# Patient Record
Sex: MALE | Race: WHITE | Employment: FULL TIME | URBAN - METROPOLITAN AREA
[De-identification: names, ages, dates, MRNs, and addresses within clinical notes are randomized per-mention and may not be internally consistent; named-entity substitution may affect disease eponyms.]

---

## 2017-10-27 ENCOUNTER — HOSPITAL ENCOUNTER (OUTPATIENT)
Dept: PHYSICAL THERAPY | Age: 57
Discharge: HOME | End: 2017-10-27
Attending: NURSE PRACTITIONER
Payer: COMMERCIAL

## 2017-10-27 DIAGNOSIS — M54.5 LOW BACK PAIN, UNSPECIFIED BACK PAIN LATERALITY, UNSPECIFIED CHRONICITY, WITH SCIATICA PRESENCE UNSPECIFIED: ICD-10-CM

## 2017-10-27 PROCEDURE — 97014 ELECTRIC STIMULATION THERAPY: CPT

## 2017-10-27 PROCEDURE — 97140 MANUAL THERAPY 1/> REGIONS: CPT

## 2017-10-27 PROCEDURE — 97162 PT EVAL MOD COMPLEX 30 MIN: CPT

## 2017-10-27 NOTE — PROGRESS NOTES
Faizan Zapien  : 1960   Payor: Jenifer Leija / Plan: Za Quant / Product Type: Commerical /  [address]  at [address]  [address], [address], [place]  [phone]   [phone]        OUTPATIENT PHYSICAL THERAPY:Initial Assessment 10/27/2017   ICD-10: Treatment Diagnosis: Cervicalgia (M54.2); Low back pain (M54.5)  Precautions/Allergies:   Review of patient's allergies indicates no known allergies. Fall Risk Score: 1 (? 5 = High Risk)  MD Orders: please evaluate patient for low back pain, patient request dry needling MEDICAL/REFERRING DIAGNOSIS:  Low back pain, unspecified back pain laterality, unspecified chronicity, with sciatica presence unspecified [M54.5]   DATE OF ONSET: 6 weeks ago  REFERRING PHYSICIAN: Bunny Caputo NP  RETURN PHYSICIAN APPOINTMENT: --     INITIAL ASSESSMENT:  Mr. Pam Rae presents with low back pain and pain radiating down R arm. He has increased tone and tenderness in thoracic erector spinae musculature. Pt will benefit from skilled physical therapy to address dysfunctions and pain. PROBLEM LIST (Impacting functional limitations):  1. Decreased ADL/Functional Activities  2. Increased Pain  3. Decreased [place] with Home Exercise Program INTERVENTIONS PLANNED:  1. Home Exercise Program (HEP)  2. Manual Therapy including joint and soft tissue manipulation and mobilization, and dry needling  3. Therapeutic Exercise/Strengthening  4. Modalities including heat/cold application and electrical stimulation   TREATMENT PLAN:  Effective Dates: 10/27/17 TO 17. Frequency/Duration: 1-2 time a week for 5 weeks  GOALS: (Goals have been discussed and agreed upon with patient.)  Short-Term Functional Goals: Time Frame: 5 weeks  1. Pt will be independent with > or = 2 exercises in HEP. 2. Pt will report < or = 16 on Oswestry. Discharge Goals: Time Frame: 10 weeks  1. Pt will be independent with > or = 4 exercises in HEP. 2. Pt will report < or = 12 on Oswestry. Rehabilitation Potential For Stated Goals: Good  Regarding Rupinder Bonds therapy, I certify that the treatment plan above will be carried out by a therapist or under their direction. Thank you for this referral,  Sary Lamar, PT, DPT     Referring Physician Signature: Sarai Lopez NP              Date                    The information in this section was collected on 10/27/17 (except where otherwise noted). HISTORY:   History of Present Injury/Illness (Reason for Referral):  Pt reports right side low-mid back pain aggravated specifically by turning to the left for example when drying off after shower and wiping bottom after going to the bathroom. The pain is not bad until he has an 'episode' or it gets aggravated, then the pain is unbearable. He had come for physical therapy in December of 2016 here at [facility] clinic, treatment focused on stretching and dry needling and he reported pain relief which lasted until late summer/early fall of 2017. He requested from his doctor to return to physical therapy. He has also been experiencing pain down right shoulder into right arm. Past Medical History/Comorbidities:   Mr. Willian Ramírez  has a past medical history of Chronic pharyngitis; Dry mouth; Erectile dysfunction; Essential hypertension, benign; Hypercholesteremia; Laryngopharyngeal reflux; Other and unspecified hyperlipidemia; Reflux gastritis; and S/P colonoscopy. Mr. Willian Ramírez  has a past surgical history that includes shoulder arthroscopy (Right); knee arthroscopy (Right); and colonoscopy (2010).   Social History/Living Environment:     house  Prior Level of Function/Work/Activity:  Work in [employer] lab, involves sitting and standing positions  Dominant Side:         LEFT    Current Medications:       Current Outpatient Prescriptions:     simvastatin (ZOCOR) 40 mg tablet, TAKE 1 TABLET BY MOUTH NIGHTLY, Disp: 90 Tab, Rfl: 3    NEXIUM 40 mg capsule, Take 1 Cap by mouth daily. , Disp: 90 Cap, Rfl: 3    sildenafil citrate (VIAGRA) 100 mg tablet, Take 1 Tab by mouth as needed. , Disp: 10 Tab, Rfl: 5    lisinopril (PRINIVIL, ZESTRIL) 10 mg tablet, TAKE ONE TABLET BY MOUTH EVERY DAY, Disp: 90 Tab, Rfl: 3   Date Last Reviewed:  10/27/2017     Number of Personal Factors/Comorbidities that affect the Plan of Care: 1-2: MODERATE COMPLEXITY   EXAMINATION:   Observation/Orthostatic Postural Assessment:          Pt has no significant postural abnormalities in standing. Palpation:          Increased tenderness and tone in R mid-thoracic erector spinae (possibly longissimus)   ROM:          See below  Strength:          Generally decreased core strength  Balance:          No functional deficits noted  Special tests: negative Spurlings for cervical radiculopathy  SFMA Top Tier (FN = Functional and Non-painful, FP = Functional and Painful, DP = Dysfunctional and Painful, DN = Dysfunctional and Non-painful)  Cervical flexion: FN  Cervical extension: DN   Cervical rotation: L FN, R FN  Upper Extremity Pattern 1 (extension, IR): L DN, R DN  Upper Extremity Pattern 2 (flexion, ER): L DN, R DN  Multi-segmental flexion: DP  Multi-segmental extension: DP  Multi-segmental rotation: L DP, R DP  Single-Leg Stance: L FN, R FN  Overhead Deep Squat: DN   Summary: patient has the most pain and movement limitation with left multi-segmental rotation; demonstrates decreased mobility into flexion of lumbar spine and into extension of lumbar and thoracic region      Body Structures Involved:  1. Nerves  2. Bones  3. Joints  4. Muscles Body Functions Affected:  1. Sensory/Pain  2. Neuromusculoskeletal  3. Movement Related Activities and Participation Affected:  1. General Tasks and Demands  2. Self Care  3.  Community, Social and [place]   Number of elements (examined above) that affect the Plan of Care: 3: MODERATE COMPLEXITY   CLINICAL PRESENTATION:   Presentation: Evolving clinical presentation with changing clinical characteristics: MODERATE COMPLEXITY   CLINICAL DECISION MAKING:   Outcome Measure: Tool Used: Modified Oswestry Low Back Pain Questionnaire  Score:  Initial: 20/50  Most Recent: X/50 (Date: -- )   Interpretation of Score: Each section is scored on a 0-5 scale, 5 representing the greatest disability. The scores of each section are added together for a total score of 50. Score 0 1-10 11-20 21-30 31-40 41-49 50   Modifier CH CI CJ CK CL CM CN     Medical Necessity:   · Skilled intervention continues to be required due to decreased function. Reason for Services/Other Comments:  · Patient continues to require skilled intervention due to decreased function. Use of outcome tool(s) and clinical judgement create a POC that gives a: Questionable prediction of patient's progress: MODERATE COMPLEXITY            TREATMENT:   (In addition to Assessment/Re-Assessment sessions the following treatments were rendered)  Pre-treatment Symptoms/Complaints:  Pt reports high levels of pain when he gets pain in his right side back. The pain that radiates down R arm isn't severe, its just a constant dull ache. Pain: Initial:   -- Post Session:  --     THERAPEUTIC EXERCISE: (-- minutes):  Exercises per grid below to improve mobility and strength. Date:  10/27 Date:   Date:     Activity/Exercise Parameters Parameters Parameters         Thoracic flexion and extension X 10  And x 10 with rotational component     Open book X 10                                 MANUAL THERAPY: ( 15 minutes):  To increase motion and reduce pain  - instrument assisted soft tissue technique using dry needles to trigger points in R thoracic erector spinae musculature    MODALITIES: ( 10 minutes):   - premod electrical stimulation in conjunction with manual therapy to R thoracic erector spinae mm, intensity adjusted to pt tolerance    Treatment/Session Assessment:    · Response to Treatment:  Pt was agreeable to dry needling, no complications reported. · Compliance with Program/Exercises: Will assess as treatment progresses. · Recommendations/Intent for next treatment session: \"Next visit will focus on advancements to more challenging activities\".   Total Treatment Duration:     Future Appointments  Date Time Provider Felicia Kylah   11/10/2017 9:15 AM Frances Simmonds, PT, DPT [facility]   11/17/2017 9:15 AM Barbara Davila PT, KING [facility]   11/22/2017 9:15 AM Barbara Davila PT, DPT [facility]   9/18/2018 8:00 AM Parrish Jung MD [facility] D[facility]

## 2017-10-27 NOTE — PROGRESS NOTES
Ambulatory/Rehab Services H2 Model Falls Risk Assessment    Risk Factor Pts. ·   Confusion/Disorientation/Impulsivity  []    4 ·   Symptomatic Depression  []   2 ·   Altered Elimination  []   1 ·   Dizziness/Vertigo  []   1 ·   Gender (Male)  [x]   1 ·   Any administered antiepileptics (anticonvulsants):  []   2 ·   Any administered benzodiazepines:  []   1 ·   Visual Impairment (specify):  []   1 ·   Portable Oxygen Use  []   1 ·   Orthostatic ? BP  []   1 ·   History of Recent Falls (within 3 mos.)  []   5     Ability to Rise from Chair (choose one) Pts. ·   Ability to rise in a single movement  []   0 ·   Pushes up, successful in one attempt  []   1 ·   Multiple attempts, but successful  []   3 ·   Unable to rise without assistance  []   4   Total: (5 or greater = High Risk) 1     Falls Prevention Plan:   []                Physical Limitations to Exercise (specify):   []                Mobility Assistance Device (type):   []                Exercise/Equipment Adaptation (specify):    ©2010 [facility] [address] Patent #[id number].  Federal Law prohibits the replication, distribution or use without written permission from [facility] [employer]

## 2017-11-01 ENCOUNTER — APPOINTMENT (RX ONLY)
Dept: URBAN - METROPOLITAN AREA CLINIC 349 | Facility: CLINIC | Age: 57
Setting detail: DERMATOLOGY
End: 2017-11-01

## 2017-11-01 DIAGNOSIS — L57.8 OTHER SKIN CHANGES DUE TO CHRONIC EXPOSURE TO NONIONIZING RADIATION: ICD-10-CM

## 2017-11-01 DIAGNOSIS — D22 MELANOCYTIC NEVI: ICD-10-CM

## 2017-11-01 DIAGNOSIS — L81.2 FRECKLES: ICD-10-CM

## 2017-11-01 DIAGNOSIS — D18.0 HEMANGIOMA: ICD-10-CM

## 2017-11-01 PROBLEM — D22.5 MELANOCYTIC NEVI OF TRUNK: Status: ACTIVE | Noted: 2017-11-01

## 2017-11-01 PROBLEM — D18.01 HEMANGIOMA OF SKIN AND SUBCUTANEOUS TISSUE: Status: ACTIVE | Noted: 2017-11-01

## 2017-11-01 PROCEDURE — 99213 OFFICE O/P EST LOW 20 MIN: CPT

## 2017-11-01 PROCEDURE — ? COUNSELING

## 2017-11-01 ASSESSMENT — LOCATION SIMPLE DESCRIPTION DERM
LOCATION SIMPLE: LEFT SHOULDER
LOCATION SIMPLE: CHEST
LOCATION SIMPLE: LEFT CHEEK
LOCATION SIMPLE: RIGHT HAND
LOCATION SIMPLE: RIGHT CHEEK
LOCATION SIMPLE: RIGHT FOREARM
LOCATION SIMPLE: RIGHT SHOULDER
LOCATION SIMPLE: RIGHT UPPER BACK
LOCATION SIMPLE: UPPER BACK
LOCATION SIMPLE: INFERIOR FOREHEAD
LOCATION SIMPLE: LEFT HAND
LOCATION SIMPLE: LEFT UPPER BACK
LOCATION SIMPLE: LEFT FOREARM

## 2017-11-01 ASSESSMENT — LOCATION DETAILED DESCRIPTION DERM
LOCATION DETAILED: INFERIOR MID FOREHEAD
LOCATION DETAILED: LEFT MID-UPPER BACK
LOCATION DETAILED: RIGHT POSTERIOR SHOULDER
LOCATION DETAILED: RIGHT CENTRAL MALAR CHEEK
LOCATION DETAILED: LEFT CENTRAL MALAR CHEEK
LOCATION DETAILED: LEFT PROXIMAL DORSAL FOREARM
LOCATION DETAILED: RIGHT RADIAL DORSAL HAND
LOCATION DETAILED: STERNUM
LOCATION DETAILED: SUPERIOR THORACIC SPINE
LOCATION DETAILED: RIGHT MEDIAL UPPER BACK
LOCATION DETAILED: RIGHT ANTERIOR SHOULDER
LOCATION DETAILED: RIGHT PROXIMAL DORSAL FOREARM
LOCATION DETAILED: LEFT MEDIAL INFERIOR CHEST
LOCATION DETAILED: LEFT POSTERIOR SHOULDER
LOCATION DETAILED: LEFT ULNAR DORSAL HAND
LOCATION DETAILED: LEFT ANTERIOR SHOULDER

## 2017-11-01 ASSESSMENT — LOCATION ZONE DERM
LOCATION ZONE: FACE
LOCATION ZONE: HAND
LOCATION ZONE: TRUNK
LOCATION ZONE: ARM

## 2017-11-10 ENCOUNTER — HOSPITAL ENCOUNTER (OUTPATIENT)
Dept: PHYSICAL THERAPY | Age: 57
Discharge: HOME | End: 2017-11-10
Attending: NURSE PRACTITIONER
Payer: COMMERCIAL

## 2017-11-10 PROCEDURE — 97110 THERAPEUTIC EXERCISES: CPT

## 2017-11-10 PROCEDURE — 97140 MANUAL THERAPY 1/> REGIONS: CPT

## 2017-11-10 NOTE — PROGRESS NOTES
Gracie Luz  : 1960   Payor: Jeff Lucero / Plan: Aliza Euceda / Product Type: Commerical /  [address]  at [address]  [address], [address], [place] [address]  [phone]   [phone]        OUTPATIENT PHYSICAL THERAPY:Daily Note 11/10/2017   ICD-10: Treatment Diagnosis: Cervicalgia (M54.2); Low back pain (M54.5)  Precautions/Allergies:   Review of patient's allergies indicates no known allergies. Fall Risk Score: 1 (? 5 = High Risk)  MD Orders: please evaluate patient for low back pain, patient request dry needling MEDICAL/REFERRING DIAGNOSIS:  Chronic pain syndrome [G89.4]   DATE OF ONSET: 6 weeks ago  REFERRING PHYSICIAN: Lakshmi Greene NP  RETURN PHYSICIAN APPOINTMENT: --     INITIAL ASSESSMENT:  Mr. Luisa Huffman presents with low back pain and pain radiating down R arm. He has increased tone and tenderness in thoracic erector spinae musculature. Pt will benefit from skilled physical therapy to address dysfunctions and pain. PROBLEM LIST (Impacting functional limitations):  1. Decreased ADL/Functional Activities  2. Increased Pain  3. Decreased [place] with Home Exercise Program INTERVENTIONS PLANNED:  1. Home Exercise Program (HEP)  2. Manual Therapy including joint and soft tissue manipulation and mobilization, and dry needling  3. Therapeutic Exercise/Strengthening  4. Modalities including heat/cold application and electrical stimulation   TREATMENT PLAN:  Effective Dates: 10/27/17 TO 17. Frequency/Duration: 1-2 time a week for 5 weeks  GOALS: (Goals have been discussed and agreed upon with patient.)  Short-Term Functional Goals: Time Frame: 5 weeks  1. Pt will be independent with > or = 2 exercises in HEP. 2. Pt will report < or = 16 on Oswestry. Discharge Goals: Time Frame: 10 weeks  1. Pt will be independent with > or = 4 exercises in HEP. 2. Pt will report < or = 12 on Oswestry.    Rehabilitation Potential For Stated Goals: Good  Regarding Latoya Doan's therapy, I certify that the treatment plan above will be carried out by a therapist or under their direction. Thank you for this referral,  Muna Hernandez, PT, DPT     Referring Physician Signature: Danny Amos NP              Date                    The information in this section was collected on 10/27/17 (except where otherwise noted). HISTORY:   History of Present Injury/Illness (Reason for Referral):  Pt reports right side low-mid back pain aggravated specifically by turning to the left for example when drying off after shower and wiping bottom after going to the bathroom. The pain is not bad until he has an 'episode' or it gets aggravated, then the pain is unbearable. He had come for physical therapy in December of 2016 here at [facility] clinic, treatment focused on stretching and dry needling and he reported pain relief which lasted until late summer/early fall of 2017. He requested from his doctor to return to physical therapy. He has also been experiencing pain down right shoulder into right arm. Past Medical History/Comorbidities:   Mr. Peggy Sherwood  has a past medical history of Chronic pharyngitis; Dry mouth; Erectile dysfunction; Essential hypertension, benign; Hypercholesteremia; Laryngopharyngeal reflux; Other and unspecified hyperlipidemia; Reflux gastritis; and S/P colonoscopy. Mr. Peggy Sherwood  has a past surgical history that includes shoulder arthroscopy (Right); knee arthroscopy (Right); and colonoscopy (2010). Social History/Living Environment:     house  Prior Level of Function/Work/Activity:  Work in [employer] lab, involves sitting and standing positions  Dominant Side:         LEFT    Current Medications:       Current Outpatient Prescriptions:     simvastatin (ZOCOR) 40 mg tablet, TAKE 1 TABLET BY MOUTH NIGHTLY, Disp: 90 Tab, Rfl: 3    NEXIUM 40 mg capsule, Take 1 Cap by mouth daily. , Disp: 90 Cap, Rfl: 3    sildenafil citrate (VIAGRA) 100 mg tablet, Take 1 Tab by mouth as needed. , Disp: 10 Tab, Rfl: 5    lisinopril (PRINIVIL, ZESTRIL) 10 mg tablet, TAKE ONE TABLET BY MOUTH EVERY DAY, Disp: 90 Tab, Rfl: 3   Date Last Reviewed:  11/10/2017     Number of Personal Factors/Comorbidities that affect the Plan of Care: 1-2: MODERATE COMPLEXITY   EXAMINATION:   Observation/Orthostatic Postural Assessment:          Pt has no significant postural abnormalities in standing. Palpation:          Increased tenderness and tone in R mid-thoracic erector spinae (possibly longissimus)   ROM:          See below  Strength:          Generally decreased core strength  Balance:          No functional deficits noted  Special tests: negative Spurlings for cervical radiculopathy  SFMA Top Tier (FN = Functional and Non-painful, FP = Functional and Painful, DP = Dysfunctional and Painful, DN = Dysfunctional and Non-painful)  Cervical flexion: FN  Cervical extension: DN   Cervical rotation: L FN, R FN  Upper Extremity Pattern 1 (extension, IR): L DN, R DN  Upper Extremity Pattern 2 (flexion, ER): L DN, R DN  Multi-segmental flexion: DP  Multi-segmental extension: DP  Multi-segmental rotation: L DP, R DP  Single-Leg Stance: L FN, R FN  Overhead Deep Squat: DN   Summary: patient has the most pain and movement limitation with left multi-segmental rotation; demonstrates decreased mobility into flexion of lumbar spine and into extension of lumbar and thoracic region      Body Structures Involved:  1. Nerves  2. Bones  3. Joints  4. Muscles Body Functions Affected:  1. Sensory/Pain  2. Neuromusculoskeletal  3. Movement Related Activities and Participation Affected:  1. General Tasks and Demands  2. Self Care  3.  Community, Social and [place]   Number of elements (examined above) that affect the Plan of Care: 3: MODERATE COMPLEXITY   CLINICAL PRESENTATION:   Presentation: Evolving clinical presentation with changing clinical characteristics: [address] MAKING:   Outcome Measure: Tool Used: Modified Oswestry Low Back Pain Questionnaire  Score:  Initial: 20/50  Most Recent: X/50 (Date: -- )   Interpretation of Score: Each section is scored on a 0-5 scale, 5 representing the greatest disability. The scores of each section are added together for a total score of 50. Score 0 1-10 11-20 21-30 31-40 41-49 50   Modifier CH CI CJ CK CL CM CN     Medical Necessity:   · Skilled intervention continues to be required due to decreased function. Reason for Services/Other Comments:  · Patient continues to require skilled intervention due to decreased function. Use of outcome tool(s) and clinical judgement create a POC that gives a: Questionable prediction of patient's progress: MODERATE COMPLEXITY            TREATMENT:   (In addition to Assessment/Re-Assessment sessions the following treatments were rendered)  Pre-treatment Symptoms/Complaints:  Pt reports that he felt some better after the last session but still feels the tightness    Pain: Initial:   more soreness than pain at rest Post Session:  0/10, no soreness reported     THERAPEUTIC EXERCISE: (15 min):  Exercises per grid below to improve mobility and strength. Date:  10/27 Date:  11/10 Date:     Activity/Exercise Parameters Parameters Parameters   Education  Discussed working on self elongation and stretches as listed below    Thoracic flexion and extension X 10  And x 10 with rotational component X 10, time with breathing    Open book X 10     Sitting arm reach stretch  Lat elongation with L side-bend component , 30 sec, 3 sets, timed with breathing                           MANUAL THERAPY: (30 minutes):  To increase motion and reduce pain  - instrument assisted soft tissue technique using dry needles to trigger points in R thoracic erector spinae musculature  - soft tissue mobilization to lats and erector spina on the R side, done with active elongation and breathing in supine and sitting    MODALITIES: ( 10 minutes):   - premod electrical stimulation in conjunction with manual therapy to R thoracic erector spinae mm, intensity adjusted to pt tolerance    Treatment/Session Assessment:    · Response to Treatment:  Patient tolerated the session well. He reported feeling better after the session. Discussed his home program.   · Compliance with Program/Exercises: Will assess as treatment progresses. · Recommendations/Intent for next treatment session: \"Next visit will focus on advancements to more challenging activities\".   Total Treatment Duration: 45 min  PT Patient Time In/Time Out  Time In: 1030  Time Out: 1120  Future Appointments  Date Time Provider Felicia Montero   11/17/2017 9:15 AM Manuel Pardo, PT, DPT [facility]   11/22/2017 9:15 AM Manuel Pardo PT, DPT [facility]   9/18/2018 8:00 AM Shameka Bender MD [facility]       Falguni Jacobs, PT, DPT

## 2017-11-17 ENCOUNTER — HOSPITAL ENCOUNTER (OUTPATIENT)
Dept: PHYSICAL THERAPY | Age: 57
Discharge: HOME | End: 2017-11-17
Attending: NURSE PRACTITIONER
Payer: COMMERCIAL

## 2017-11-17 PROCEDURE — 97110 THERAPEUTIC EXERCISES: CPT

## 2017-11-17 PROCEDURE — 97140 MANUAL THERAPY 1/> REGIONS: CPT

## 2017-11-17 PROCEDURE — 97014 ELECTRIC STIMULATION THERAPY: CPT

## 2017-11-17 NOTE — PROGRESS NOTES
Guerita Ponderay  : 1960   Payor: Allan Miller / Plan: Italo Hoang / Product Type: Commerical /  [address]  at [facility]  [address], [address], [place]  [phone]   [phone]        OUTPATIENT PHYSICAL THERAPY:Daily Note 2017   ICD-10: Treatment Diagnosis: Cervicalgia (M54.2); Low back pain (M54.5)  Precautions/Allergies:   Review of patient's allergies indicates no known allergies. Fall Risk Score: 1 (? 5 = High Risk)  MD Orders: please evaluate patient for low back pain, patient request dry needling MEDICAL/REFERRING DIAGNOSIS:  Chronic pain syndrome [G89.4]   DATE OF ONSET: 6 weeks ago  REFERRING PHYSICIAN: Eliana Marquez NP  RETURN PHYSICIAN APPOINTMENT: --     INITIAL ASSESSMENT:  Mr. Carmel Baig presents with low back pain and pain radiating down R arm. He has increased tone and tenderness in thoracic erector spinae musculature. Pt will benefit from skilled physical therapy to address dysfunctions and pain. PROBLEM LIST (Impacting functional limitations):  1. Decreased ADL/Functional Activities  2. Increased Pain  3. Decreased [place] with Home Exercise Program INTERVENTIONS PLANNED:  1. Home Exercise Program (HEP)  2. Manual Therapy including joint and soft tissue manipulation and mobilization, and dry needling  3. Therapeutic Exercise/Strengthening  4. Modalities including heat/cold application and electrical stimulation   TREATMENT PLAN:  Effective Dates: 10/27/17 TO 17. Frequency/Duration: 1-2 time a week for 5 weeks  GOALS: (Goals have been discussed and agreed upon with patient.)  Short-Term Functional Goals: Time Frame: 5 weeks  1. Pt will be independent with > or = 2 exercises in HEP. 2. Pt will report < or = 16 on Oswestry. Discharge Goals: Time Frame: 10 weeks  1. Pt will be independent with > or = 4 exercises in HEP. 2. Pt will report < or = 12 on Oswestry.    Rehabilitation Potential For Stated Goals: Good  Regarding Milan Scheuermann Videtto's therapy, I certify that the treatment plan above will be carried out by a therapist or under their direction. Thank you for this referral,  Jeimy English, PT, DPT     Referring Physician Signature: Venancio Peter NP              Date                    The information in this section was collected on 10/27/17 (except where otherwise noted). HISTORY:   History of Present Injury/Illness (Reason for Referral):  Pt reports right side low-mid back pain aggravated specifically by turning to the left for example when drying off after shower and wiping bottom after going to the bathroom. The pain is not bad until he has an 'episode' or it gets aggravated, then the pain is unbearable. He had come for physical therapy in December of 2016 here at [facility] clinic, treatment focused on stretching and dry needling and he reported pain relief which lasted until late summer/early fall of 2017. He requested from his doctor to return to physical therapy. He has also been experiencing pain down right shoulder into right arm. Past Medical History/Comorbidities:   Mr. Linda Brooks  has a past medical history of Chronic pharyngitis; Dry mouth; Erectile dysfunction; Essential hypertension, benign; Hypercholesteremia; Laryngopharyngeal reflux; Other and unspecified hyperlipidemia; Reflux gastritis; and S/P colonoscopy. Mr. Linda Brooks  has a past surgical history that includes shoulder arthroscopy (Right); knee arthroscopy (Right); and colonoscopy (2010). Social History/Living Environment:     house  Prior Level of Function/Work/Activity:  Work in [employer] lab, involves sitting and standing positions  Dominant Side:         LEFT    Current Medications:       Current Outpatient Prescriptions:     simvastatin (ZOCOR) 40 mg tablet, TAKE 1 TABLET BY MOUTH NIGHTLY, Disp: 90 Tab, Rfl: 3    NEXIUM 40 mg capsule, Take 1 Cap by mouth daily. , Disp: 90 Cap, Rfl: 3    sildenafil citrate (VIAGRA) 100 mg tablet, Take 1 Tab by mouth as needed. , Disp: 10 Tab, Rfl: 5    lisinopril (PRINIVIL, ZESTRIL) 10 mg tablet, TAKE ONE TABLET BY MOUTH EVERY DAY, Disp: 90 Tab, Rfl: 3   Date Last Reviewed:  11/17/2017     Number of Personal Factors/Comorbidities that affect the Plan of Care: 1-2: MODERATE COMPLEXITY   EXAMINATION:   Observation/Orthostatic Postural Assessment:          Pt has no significant postural abnormalities in standing. Palpation:          Increased tenderness and tone in R mid-thoracic erector spinae (possibly longissimus)   ROM:          See below  Strength:          Generally decreased core strength  Balance:          No functional deficits noted  Special tests: negative Spurlings for cervical radiculopathy  SFMA Top Tier (FN = Functional and Non-painful, FP = Functional and Painful, DP = Dysfunctional and Painful, DN = Dysfunctional and Non-painful)  Cervical flexion: FN  Cervical extension: DN   Cervical rotation: L FN, R FN  Upper Extremity Pattern 1 (extension, IR): L DN, R DN  Upper Extremity Pattern 2 (flexion, ER): L DN, R DN  Multi-segmental flexion: DP  Multi-segmental extension: DP  Multi-segmental rotation: L DP, R DP  Single-Leg Stance: L FN, R FN  Overhead Deep Squat: DN   Summary: patient has the most pain and movement limitation with left multi-segmental rotation; demonstrates decreased mobility into flexion of lumbar spine and into extension of lumbar and thoracic region      Body Structures Involved:  1. Nerves  2. Bones  3. Joints  4. Muscles Body Functions Affected:  1. Sensory/Pain  2. Neuromusculoskeletal  3. Movement Related Activities and Participation Affected:  1. General Tasks and Demands  2. Self Care  3.  Community, Social and [place]   Number of elements (examined above) that affect the Plan of Care: 3: MODERATE COMPLEXITY   CLINICAL PRESENTATION:   Presentation: Evolving clinical presentation with changing clinical characteristics: [address] MAKING:   Outcome Measure: Tool Used: Modified Oswestry Low Back Pain Questionnaire  Score:  Initial: 20/50  Most Recent: X/50 (Date: -- )   Interpretation of Score: Each section is scored on a 0-5 scale, 5 representing the greatest disability. The scores of each section are added together for a total score of 50. Score 0 1-10 11-20 21-30 31-40 41-49 50   Modifier CH CI CJ CK CL CM CN     Medical Necessity:   · Skilled intervention continues to be required due to decreased function. Reason for Services/Other Comments:  · Patient continues to require skilled intervention due to decreased function. Use of outcome tool(s) and clinical judgement create a POC that gives a: Questionable prediction of patient's progress: MODERATE COMPLEXITY            TREATMENT:   (In addition to Assessment/Re-Assessment sessions the following treatments were rendered)  Pre-treatment Symptoms/Complaints:  Pt reports that he has not experienced the mm spasm since restarting therapy. Back still a little tight  Pain: Initial:   no pain, some tightness Post Session:  Reports back feels better     THERAPEUTIC EXERCISE: (15 min):  Exercises per grid below to improve mobility and strength. Date:  10/27 Date:  11/10 Date:  11/17   Activity/Exercise Parameters Parameters Parameters   Education  Discussed working on self elongation and stretches as listed below    Recumbent stepper   7 min level 1   Thoracic flexion and extension X 10  And x 10 with rotational component X 10, time with breathing X 10   Open book X 10     Sitting arm reach stretch  Lat elongation with L side-bend component , 30 sec, 3 sets, timed with breathing  Lat elongation with L side-bend component , 30 sec, 3 sets, timed with breathing                          MANUAL THERAPY: (15 minutes):  To increase motion and reduce pain  - instrument assisted soft tissue technique using dry needles to trigger points in R thoracic erector spinae musculature and R thoracic multifidi  - PAs and rotational mobilizations to TL junction region    MODALITIES: ( 12 minutes):   -IFC electrical stimulation in conjunction with manual therapy to R thoracic erector spinae mm, intensity adjusted to pt tolerance    Treatment/Session Assessment:    · Response to Treatment:  Patient tolerated the session well. He reported feeling better after the session. Discussed his home program.   · Compliance with Program/Exercises: Will assess as treatment progresses. · Recommendations/Intent for next treatment session: \"Next visit will focus on advancements to more challenging activities\".   Total Treatment Duration: 45 min  PT Patient Time In/Time Out  Time In: 0915  Time Out: 1000  Future Appointments  Date Time Provider Felicia Motnero   11/22/2017 9:15 AM Linda Wu PT, DPT [facility]   9/18/2018 8:00 AM David Alvares MD [facility]       Linda Wu PT, DPT

## 2017-11-22 ENCOUNTER — HOSPITAL ENCOUNTER (OUTPATIENT)
Dept: PHYSICAL THERAPY | Age: 57
Discharge: HOME | End: 2017-11-22
Attending: NURSE PRACTITIONER
Payer: COMMERCIAL

## 2017-11-22 PROCEDURE — 97110 THERAPEUTIC EXERCISES: CPT

## 2017-11-22 PROCEDURE — 97140 MANUAL THERAPY 1/> REGIONS: CPT

## 2017-11-22 PROCEDURE — 97014 ELECTRIC STIMULATION THERAPY: CPT

## 2017-11-22 NOTE — PROGRESS NOTES
Derek Genre  : 1960   Payor: Desirae Stern / Plan: Chidi Rust / Product Type: Commerical /  [address]  at [facility]  [address], [place] [address]  [phone]   [phone]        OUTPATIENT PHYSICAL THERAPY:Daily Note 2017   ICD-10: Treatment Diagnosis: Cervicalgia (M54.2); Low back pain (M54.5)  Precautions/Allergies:   Review of patient's allergies indicates no known allergies. Fall Risk Score: 1 (? 5 = High Risk)  MD Orders: please evaluate patient for low back pain, patient request dry needling MEDICAL/REFERRING DIAGNOSIS:  Chronic pain syndrome [G89.4]   DATE OF ONSET: 6 weeks ago  REFERRING PHYSICIAN: Venancio Peter NP  RETURN PHYSICIAN APPOINTMENT: --     INITIAL ASSESSMENT:  Mr. Linda Brooks presents with low back pain and pain radiating down R arm. He has increased tone and tenderness in thoracic erector spinae musculature. Pt will benefit from skilled physical therapy to address dysfunctions and pain. PROBLEM LIST (Impacting functional limitations):  1. Decreased ADL/Functional Activities  2. Increased Pain  3. Decreased [place] with Home Exercise Program INTERVENTIONS PLANNED:  1. Home Exercise Program (HEP)  2. Manual Therapy including joint and soft tissue manipulation and mobilization, and dry needling  3. Therapeutic Exercise/Strengthening  4. Modalities including heat/cold application and electrical stimulation   TREATMENT PLAN:  Effective Dates: 10/27/17 TO 17. Frequency/Duration: 1-2 time a week for 5 weeks  GOALS: (Goals have been discussed and agreed upon with patient.)  Short-Term Functional Goals: Time Frame: 5 weeks  1. Pt will be independent with > or = 2 exercises in HEP. 2. Pt will report < or = 16 on Oswestry. Discharge Goals: Time Frame: 10 weeks  1. Pt will be independent with > or = 4 exercises in HEP. 2. Pt will report < or = 12 on Oswestry.    Rehabilitation Potential For Stated Goals: Good  Regarding Lizzie Doan's therapy, I certify that the treatment plan above will be carried out by a therapist or under their direction. Thank you for this referral,  Linda Wu, PT, DPT     Referring Physician Signature: Thomas Sanches NP              Date                    The information in this section was collected on 10/27/17 (except where otherwise noted). HISTORY:   History of Present Injury/Illness (Reason for Referral):  Pt reports right side low-mid back pain aggravated specifically by turning to the left for example when drying off after shower and wiping bottom after going to the bathroom. The pain is not bad until he has an 'episode' or it gets aggravated, then the pain is unbearable. He had come for physical therapy in December of 2016 here at [facility] clinic, treatment focused on stretching and dry needling and he reported pain relief which lasted until late summer/early fall of 2017. He requested from his doctor to return to physical therapy. He has also been experiencing pain down right shoulder into right arm. Past Medical History/Comorbidities:   Mr. Deana Bradford  has a past medical history of Chronic pharyngitis; Dry mouth; Erectile dysfunction; Essential hypertension, benign; Hypercholesteremia; Laryngopharyngeal reflux; Other and unspecified hyperlipidemia; Reflux gastritis; and S/P colonoscopy. Mr. Deana Bradford  has a past surgical history that includes shoulder arthroscopy (Right); knee arthroscopy (Right); and colonoscopy (2010). Social History/Living Environment:     house  Prior Level of Function/Work/Activity:  Work in [employer] lab, involves sitting and standing positions  Dominant Side:         LEFT    Current Medications:       Current Outpatient Prescriptions:     simvastatin (ZOCOR) 40 mg tablet, TAKE 1 TABLET BY MOUTH NIGHTLY, Disp: 90 Tab, Rfl: 3    NEXIUM 40 mg capsule, Take 1 Cap by mouth daily. , Disp: 90 Cap, Rfl: 3    sildenafil citrate (VIAGRA) 100 mg tablet, Take 1 Tab by mouth as needed. , Disp: 10 Tab, Rfl: 5    lisinopril (PRINIVIL, ZESTRIL) 10 mg tablet, TAKE ONE TABLET BY MOUTH EVERY DAY, Disp: 90 Tab, Rfl: 3   Date Last Reviewed:  11/22/2017     Number of Personal Factors/Comorbidities that affect the Plan of Care: 1-2: MODERATE COMPLEXITY   EXAMINATION:   Observation/Orthostatic Postural Assessment:          Pt has no significant postural abnormalities in standing. Palpation:          Increased tenderness and tone in R mid-thoracic erector spinae (possibly longissimus)   ROM:          See below  Strength:          Generally decreased core strength  Balance:          No functional deficits noted  Special tests: negative Spurlings for cervical radiculopathy  SFMA Top Tier (FN = Functional and Non-painful, FP = Functional and Painful, DP = Dysfunctional and Painful, DN = Dysfunctional and Non-painful)  Cervical flexion: FN  Cervical extension: DN   Cervical rotation: L FN, R FN  Upper Extremity Pattern 1 (extension, IR): L DN, R DN  Upper Extremity Pattern 2 (flexion, ER): L DN, R DN  Multi-segmental flexion: DP  Multi-segmental extension: DP  Multi-segmental rotation: L DP, R DP  Single-Leg Stance: L FN, R FN  Overhead Deep Squat: DN   Summary: patient has the most pain and movement limitation with left multi-segmental rotation; demonstrates decreased mobility into flexion of lumbar spine and into extension of lumbar and thoracic region      Body Structures Involved:  1. Nerves  2. Bones  3. Joints  4. Muscles Body Functions Affected:  1. Sensory/Pain  2. Neuromusculoskeletal  3. Movement Related Activities and Participation Affected:  1. General Tasks and Demands  2. Self Care  3.  Community, Social and [place]   Number of elements (examined above) that affect the Plan of Care: 3: MODERATE COMPLEXITY   CLINICAL PRESENTATION:   Presentation: Evolving clinical presentation with changing clinical characteristics: [address] MAKING:   Outcome Measure: Tool Used: Modified Oswestry Low Back Pain Questionnaire  Score:  Initial: 20/50  Most Recent: X/50 (Date: -- )   Interpretation of Score: Each section is scored on a 0-5 scale, 5 representing the greatest disability. The scores of each section are added together for a total score of 50. Score 0 1-10 11-20 21-30 31-40 41-49 50   Modifier CH CI CJ CK CL CM CN     Medical Necessity:   · Skilled intervention continues to be required due to decreased function. Reason for Services/Other Comments:  · Patient continues to require skilled intervention due to decreased function. Use of outcome tool(s) and clinical judgement create a POC that gives a: Questionable prediction of patient's progress: MODERATE COMPLEXITY            TREATMENT:   (In addition to Assessment/Re-Assessment sessions the following treatments were rendered)  Pre-treatment Symptoms/Complaints:  Pt reports that the right side still feels tight but that he hasn't experienced the back pain or spasms since starting therapy. Pain: Initial:   no pain, some tightness Post Session:  Reports back feels better     THERAPEUTIC EXERCISE: (30 min):  Exercises per grid below to improve mobility and strength.     Date:  10/27 Date:  11/10 Date:  11/17 11/22   Activity/Exercise Parameters Parameters Parameters    Education  Discussed working on self elongation and stretches as listed below     Recumbent stepper   7 min level 1 8 min level 3   Thoracic flexion and extension X 10  And x 10 with rotational component X 10, time with breathing X 10 X 10   Open book X 10      Sitting arm reach stretch  Lat elongation with L side-bend component , 30 sec, 3 sets, timed with breathing  Lat elongation with L side-bend component , 30 sec, 3 sets, timed with breathing  Lat elongation with L side-bend component , 30 sec, 3 sets, timed with breathing    Cat- camel    X 10   Quadruped, arm behind back, thoracic rotation    X 5 with therapist overpressure   Prone press up    X 10       MANUAL THERAPY: (15 minutes): To increase motion and reduce pain  - instrument assisted soft tissue technique using dry needles to trigger points in R thoracic erector spinae musculature and R thoracic multifidi  - PAs and rotational mobilizations to TL junction region    MODALITIES: ( 15 minutes):   -IFC electrical stimulation in conjunction with manual therapy to R thoracic erector spinae mm, intensity adjusted to pt tolerance    Treatment/Session Assessment:    · Response to Treatment:  Patient tolerated the session well. He reported feeling better after the session. Pt scheduled one more treatment for a couple weeks out to assess long term progress. · Compliance with Program/Exercises: Will assess as treatment progresses. · Recommendations/Intent for next treatment session: \"Next visit will focus on advancements to more challenging activities\".   Total Treatment Duration:   PT Patient Time In/Time Out  Time In: 0915  Time Out: 1010  Future Appointments  Date Time Provider Felicia Montero   12/15/2017 9:15 AM Irais Burt PT, DPT [facility]   9/18/2018 8:00 AM Azalia Abernathy MD [facility]       Irais Burt PT DPT

## 2017-12-15 ENCOUNTER — HOSPITAL ENCOUNTER (OUTPATIENT)
Dept: PHYSICAL THERAPY | Age: 57
Discharge: HOME | End: 2017-12-15
Attending: NURSE PRACTITIONER
Payer: COMMERCIAL

## 2017-12-15 PROCEDURE — 97140 MANUAL THERAPY 1/> REGIONS: CPT

## 2017-12-15 PROCEDURE — 97110 THERAPEUTIC EXERCISES: CPT

## 2017-12-15 NOTE — PROGRESS NOTES
Bakari Pizano  : 1960   Payor: Renae Israel / Plan: Lillian Berry / Product Type: Commerical /  [address]  at [address]  [address], [address], [place] [address]  [phone]   [phone]        OUTPATIENT PHYSICAL THERAPY:Daily Note and Recertification    ICD-10: Treatment Diagnosis: Cervicalgia (M54.2); Low back pain (M54.5)  Precautions/Allergies:   Review of patient's allergies indicates no known allergies. Fall Risk Score: 1 (? 5 = High Risk)  MD Orders: please evaluate patient for low back pain, patient request dry needling MEDICAL/REFERRING DIAGNOSIS:  Chronic pain syndrome [G89.4]   DATE OF ONSET: --  REFERRING PHYSICIAN: Thomas Sanches NP  RETURN PHYSICIAN APPOINTMENT: --     INITIAL ASSESSMENT:  Mr. Deana Bradford has attended 5 physical therapy treatments for back pain. He has responded well to dry needling technique with appropriate HEP, he reports that the low back pain has not been bothersome over the past few weeks. Today he returned to physical therapy with a report of right scapulothoracic pain from overstressing a muscle in the gym. We addressed this with dry needling and exercise. Patient will benefit from continuing physical therapy until these symptoms calm down. PROBLEM LIST (Impacting functional limitations):  1. Decreased ADL/Functional Activities  2. Increased Pain  3. Decreased [place] with Home Exercise Program INTERVENTIONS PLANNED:  1. Home Exercise Program (HEP)  2. Manual Therapy including joint and soft tissue manipulation and mobilization, and dry needling  3. Therapeutic Exercise/Strengthening  4. Modalities including heat/cold application and electrical stimulation   TREATMENT PLAN:  Effective Dates: 12/15/17 to 2/15/18. Frequency/Duration: as needed  GOALS: (Goals have been discussed and agreed upon with patient.)  Short-Term Functional Goals: Time Frame: 5 weeks  1.  Pt will be independent with > or = 2 exercises in HEP. met  2. Pt will report < or = 16 on Oswestry. ongoing  Discharge Goals: Time Frame: 10 weeks  1. Pt will be independent with > or = 4 exercises in HEP. met  2. Pt will report < or = 12 on Oswestry. ongoing  Rehabilitation Potential For Stated Goals: Good  Regarding Michaela Doan's therapy, I certify that the treatment plan above will be carried out by a therapist or under their direction. Thank you for this referral,  Kristie Sky, PT, DPT                 The information in this section was collected on 10/27/17 (except where otherwise noted). HISTORY:   History of Present Injury/Illness (Reason for Referral):  Pt reports right side low-mid back pain aggravated specifically by turning to the left for example when drying off after shower and wiping bottom after going to the bathroom. The pain is not bad until he has an 'episode' or it gets aggravated, then the pain is unbearable. He had come for physical therapy in December of 2016 here at [facility] clinic, treatment focused on stretching and dry needling and he reported pain relief which lasted until late summer/early fall of 2017. He requested from his doctor to return to physical therapy. He has also been experiencing pain down right shoulder into right arm. Past Medical History/Comorbidities:   Mr. Kayleigh Zavaleta  has a past medical history of Chronic pharyngitis; Dry mouth; Erectile dysfunction; Essential hypertension, benign; Hypercholesteremia; Laryngopharyngeal reflux; Other and unspecified hyperlipidemia; Reflux gastritis; and S/P colonoscopy. Mr. Kayleigh Zavaleta  has a past surgical history that includes shoulder arthroscopy (Right); knee arthroscopy (Right); and colonoscopy (2010).   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 , Disp: 90 Cap, Rfl: 3    sildenafil citrate (VIAGRA) 100 mg tablet, Take 1 Tab by mouth as needed. , Disp: 10 Tab, Rfl: 5    lisinopril (PRINIVIL, ZESTRIL) 10 mg tablet, TAKE ONE TABLET BY MOUTH EVERY DAY, Disp: 90 Tab, Rfl: 3   Date Last Reviewed:  12/15/2017     Number of Personal Factors/Comorbidities that affect the Plan of Care: 1-2: MODERATE COMPLEXITY   EXAMINATION:   Observation/Orthostatic Postural Assessment:          Pt has no significant postural abnormalities in standing. Palpation:          Increased tenderness and tone in R mid-thoracic erector spinae (possibly longissimus)   ROM:          See below  Strength:          Generally decreased core strength  Balance:          No functional deficits noted  Special tests: negative Spurlings for cervical radiculopathy  SFMA Top Tier (FN = Functional and Non-painful, FP = Functional and Painful, DP = Dysfunctional and Painful, DN = Dysfunctional and Non-painful)  Cervical flexion: FN  Cervical extension: DN   Cervical rotation: L FN, R FN  Upper Extremity Pattern 1 (extension, IR): L DN, R DN  Upper Extremity Pattern 2 (flexion, ER): L DN, R DN  Multi-segmental flexion: DP  Multi-segmental extension: DP  Multi-segmental rotation: L DP, R DP  Single-Leg Stance: L FN, R FN  Overhead Deep Squat: DN   Summary: patient has the most pain and movement limitation with left multi-segmental rotation; demonstrates decreased mobility into flexion of lumbar spine and into extension of lumbar and thoracic region      Body Structures Involved:  1. Nerves  2. Bones  3. Joints  4. Muscles Body Functions Affected:  1. Sensory/Pain  2. Neuromusculoskeletal  3. Movement Related Activities and Participation Affected:  1. General Tasks and Demands  2. Self Care  3.  Community, Social and [place]   Number of elements (examined above) that affect the Plan of Care: 3: MODERATE COMPLEXITY   CLINICAL PRESENTATION:   Presentation: Evolving clinical presentation with changing clinical characteristics: MODERATE COMPLEXITY   CLINICAL DECISION MAKING:   Outcome Measure: Tool Used: Modified Oswestry Low Back Pain Questionnaire  Score:  Initial: 20/50  Most Recent: X/50 (Date: -- )   Interpretation of Score: Each section is scored on a 0-5 scale, 5 representing the greatest disability. The scores of each section are added together for a total score of 50. Score 0 1-10 11-20 21-30 31-40 41-49 50   Modifier CH CI CJ CK CL CM CN     Medical Necessity:   · Skilled intervention continues to be required due to decreased function. Reason for Services/Other Comments:  · Patient continues to require skilled intervention due to decreased function. Use of outcome tool(s) and clinical judgement create a POC that gives a: Questionable prediction of patient's progress: MODERATE COMPLEXITY            TREATMENT:   (In addition to Assessment/Re-Assessment sessions the following treatments were rendered)  Pre-treatment Symptoms/Complaints:  Pt reports his low back pain has stayed away, but last week was doing dumbell raises at the gym and hurt his upper back. Pain in between shoulder blades on the right side. Pain: Initial:   7/10 Post Session:  0/10, soreness     THERAPEUTIC EXERCISE: (25 min):  Exercises per grid below to improve mobility and strength.     Date:  10/27 Date:  11/10 Date:  11/17 11/22 12/15   Activity/Exercise Parameters Parameters Parameters     Education  Discussed working on self elongation and stretches as listed below   Ball roll on wall for self-mfr   UBE     5/5 level 1   Recumbent stepper   7 min level 1 8 min level 3    Thoracic flexion and extension X 10  And x 10 with rotational component X 10, time with breathing X 10 X 10    Open book X 10       Sitting arm reach stretch  Lat elongation with L side-bend component , 30 sec, 3 sets, timed with breathing  Lat elongation with L side-bend component , 30 sec, 3 sets, timed with breathing  Lat elongation with L side-bend component , 30 sec, 3 sets, timed with breathing     Cat- camel    X 10    Quadruped, arm behind back, thoracic rotation    X 5 with therapist overpressure    Prone press up    X 10    B ER with scapular squeeze     X 20   Cross arm stretch     10 sec hold x 5   Levator scap stretch     10 sec hold x 5   Shoulder extension     gtb x 20   rows     gtb x 20               MANUAL THERAPY: (25 minutes): To increase motion and reduce pain  - instrument assisted soft tissue technique following written and verbal consent to trigger points in R UT, R levator scapulae, R mid-trap and rhomboids    MODALITIES: ( 5 minutes):   - moist heat to R scapulothoracic region    Treatment/Session Assessment:    · Response to Treatment:  Patient reported significant reduction in pain with manual therapy. Scheduled one more appointment for a few weeks out. · Compliance with Program/Exercises: Will assess as treatment progresses. · Recommendations/Intent for next treatment session: \"Next visit will focus on advancements to more challenging activities\".   Total Treatment Duration:   PT Patient Time In/Time Out  Time In: 0915  Time Out: 1710  Future Appointments  Date Time Provider Felicia Montero   1/10/2018 9:15 AM Saravanan Porras PT, DPT [facility]   9/18/2018 8:00 AM Prince Alcantar MD [facility]       Saravanan Porras PT, DPT

## 2018-01-10 ENCOUNTER — HOSPITAL ENCOUNTER (OUTPATIENT)
Dept: PHYSICAL THERAPY | Age: 58
Discharge: HOME | End: 2018-01-10
Attending: NURSE PRACTITIONER

## 2018-01-10 NOTE — PROGRESS NOTES
Francisca Finn  : 1960  Primary: Socorro Rides Rpn  Secondary:  [facility] [place] [address], [address], [place] [address]  [phone]   [phone]      Francisca Finn cancelled physical therapy appointment this am.    Rajinder Villanueva, PT, DPT

## 2018-05-25 NOTE — PROGRESS NOTES
Rossy Spear  : 1960   Payor: Candace Tran / Plan: Mario Laughter / Product Type: Commerical /  [address]  [facility] [address], [place] [address]  [phone]   [phone]        OUTPATIENT PHYSICAL [facility] 2018   ICD-10: Treatment Diagnosis: Cervicalgia (M54.2); Low back pain (M54.5)  Precautions/Allergies:   Review of patient's allergies indicates no known allergies. Fall Risk Score: 1 (? 5 = High Risk)  MD Orders: please evaluate patient for low back pain, patient request dry needling MEDICAL/REFERRING DIAGNOSIS:  Chronic pain syndrome [G89.4]   DATE OF ONSET: --  REFERRING PHYSICIAN: Jonnathan Peng NP  RETURN PHYSICIAN APPOINTMENT: --     INITIAL ASSESSMENT:  Mr. Jasmin Nichols attended 5 physical therapy treatments for back pain from 10-27-17 to 17. He responded well to dry needling technique with appropriate HEP, he reported a general resolution of symptoms. He did not schedule further appointments, plan of care is discharged. TREATMENT PLAN:  Discharge.     Thank you for this referral,  Manuel Pardo, PT, DPT

## 2018-10-03 ENCOUNTER — HOSPITAL ENCOUNTER (OUTPATIENT)
Dept: ULTRASOUND IMAGING | Age: 58
Discharge: HOME | End: 2018-10-03
Attending: FAMILY MEDICINE
Payer: COMMERCIAL

## 2018-10-03 DIAGNOSIS — R42 LIGHTHEADEDNESS: ICD-10-CM

## 2018-10-03 PROCEDURE — 93880 EXTRACRANIAL BILAT STUDY: CPT

## 2018-10-24 ENCOUNTER — APPOINTMENT (RX ONLY)
Dept: URBAN - METROPOLITAN AREA CLINIC 349 | Facility: CLINIC | Age: 58
Setting detail: DERMATOLOGY
End: 2018-10-24

## 2018-10-24 DIAGNOSIS — D22 MELANOCYTIC NEVI: ICD-10-CM

## 2018-10-24 DIAGNOSIS — Z71.89 OTHER SPECIFIED COUNSELING: ICD-10-CM

## 2018-10-24 DIAGNOSIS — L84 CORNS AND CALLOSITIES: ICD-10-CM

## 2018-10-24 DIAGNOSIS — L57.8 OTHER SKIN CHANGES DUE TO CHRONIC EXPOSURE TO NONIONIZING RADIATION: ICD-10-CM

## 2018-10-24 PROBLEM — D22.61 MELANOCYTIC NEVI OF RIGHT UPPER LIMB, INCLUDING SHOULDER: Status: ACTIVE | Noted: 2018-10-24

## 2018-10-24 PROBLEM — D22.62 MELANOCYTIC NEVI OF LEFT UPPER LIMB, INCLUDING SHOULDER: Status: ACTIVE | Noted: 2018-10-24

## 2018-10-24 PROBLEM — D22.5 MELANOCYTIC NEVI OF TRUNK: Status: ACTIVE | Noted: 2018-10-24

## 2018-10-24 PROCEDURE — ? TREATMENT REGIMEN

## 2018-10-24 PROCEDURE — 99213 OFFICE O/P EST LOW 20 MIN: CPT

## 2018-10-24 PROCEDURE — ? RECOMMENDATIONS

## 2018-10-24 PROCEDURE — ? PRESCRIPTION

## 2018-10-24 PROCEDURE — ? COUNSELING

## 2018-10-24 RX ORDER — AMMONIUM LACTATE 120 MG/G
LOTION TOPICAL
Qty: 1 | Refills: 2 | Status: ERX | COMMUNITY
Start: 2018-10-24

## 2018-10-24 RX ADMIN — AMMONIUM LACTATE: 120 LOTION TOPICAL at 17:42

## 2018-10-24 ASSESSMENT — LOCATION SIMPLE DESCRIPTION DERM
LOCATION SIMPLE: CHEST
LOCATION SIMPLE: LEFT UPPER ARM
LOCATION SIMPLE: LEFT FOREARM
LOCATION SIMPLE: RIGHT FOOT
LOCATION SIMPLE: RIGHT UPPER BACK
LOCATION SIMPLE: RIGHT FOREARM
LOCATION SIMPLE: RIGHT UPPER ARM

## 2018-10-24 ASSESSMENT — LOCATION ZONE DERM
LOCATION ZONE: ARM
LOCATION ZONE: FEET
LOCATION ZONE: TRUNK

## 2018-10-24 ASSESSMENT — LOCATION DETAILED DESCRIPTION DERM
LOCATION DETAILED: RIGHT ANTERIOR DISTAL UPPER ARM
LOCATION DETAILED: RIGHT PROXIMAL DORSAL FOREARM
LOCATION DETAILED: LEFT PROXIMAL DORSAL FOREARM
LOCATION DETAILED: LEFT MEDIAL SUPERIOR CHEST
LOCATION DETAILED: LEFT MEDIAL INFERIOR CHEST
LOCATION DETAILED: RIGHT LATERAL PLANTAR FOOT
LOCATION DETAILED: RIGHT MID-UPPER BACK
LOCATION DETAILED: LEFT ANTERIOR PROXIMAL UPPER ARM

## 2018-10-24 NOTE — PROCEDURE: RECOMMENDATIONS
Detail Level: Zone
Recommendations (Free Text): Advised patient to try orthopedic appliance store for insert to take pressure off this area

## 2019-10-14 ENCOUNTER — HOSPITAL ENCOUNTER (OUTPATIENT)
Dept: PHYSICAL THERAPY | Age: 59
Discharge: HOME | End: 2019-10-14
Attending: FAMILY MEDICINE
Payer: COMMERCIAL

## 2019-10-14 DIAGNOSIS — M76.62 ACHILLES TENDINITIS OF LEFT LOWER EXTREMITY: ICD-10-CM

## 2019-10-14 PROCEDURE — 97110 THERAPEUTIC EXERCISES: CPT

## 2019-10-14 PROCEDURE — 97016 VASOPNEUMATIC DEVICE THERAPY: CPT

## 2019-10-14 PROCEDURE — 97161 PT EVAL LOW COMPLEX 20 MIN: CPT

## 2019-10-15 NOTE — PROGRESS NOTES
Bettyjane Gosselin  : 1960  Primary: Monisha Guthrie [address] Rpn  Secondary:  [address] at [facility]  [address] , [address], [place] [address]  [phone]   [phone]                                  OUTPATIENT PHYSICAL THERAPY: Daily Treatment Note 10/14/2019  ICD-10: Treatment Diagnosis: Left achilles tendonitis ( M76.62)  Precautions/Allergies:   Patient has no known allergies. TREATMENT PLAN:  Effective Dates: 10/14/2019 TO 2019 (30 days). Frequency/Duration: 2 times a week for 30 Day(s)     MEDICAL/REFERRING DIAGNOSIS:  Achilles tendinitis of left lower extremity [M76.62]   DATE OF ONSET: 8 months ago  REFERRING PHYSICIAN: Ever Ashton MD MD Orders: Eval and Treat  Return MD Appointment: 10-23-19     Pre-treatment Symptoms/Complaints:  Pt complains of intermittent left heel pain. Pain: Initial: Pain Intensity 1: 2  Pain Location 1: Ankle  Pain Orientation 1: Left  Post Session:  2/10   Medications Last Reviewed:  10/14/2019  Updated Objective Findings:     TREATMENT:   THERAPEUTIC EXERCISE: (10 minutes):  Exercises per grid below to improve mobility, strength and coordination. Required minimal verbal cues to promote proper body mechanics. Progressed resistance, range and repetitions as indicated. Date:  10-14-19 Date:   Date:     Activity/Exercise Parameters Parameters Parameters   gastroc stretch  Foot on wall 10 x 5\"                                         MODALITIES: (15 minutes):      vasopneumatic compression with Game Ready moderate compression and temp 36 degrees to left foot and ankle for edema reduction and pain control  Treatment/Session Summary:    · Response to Treatment: Bettyjane Gosselin demonstrates good understanding of initial HEP.   · Communication/Consultation:  None today  · Equipment provided today:  None today  · Recommendations/Intent for next treatment session: Next visit will focus on left heel pain.     Total Treatment Billable Duration:  10 min therex  15 min vaso  PT Patient Time In/Time Out  Time In: 1030  Time Out: 1115  Jess Garrett PT    Future Appointments   Date Time Provider Felicia Kylah   10/21/2019  9:45 AM Marcos Chappell PT [facility]   10/23/2019 10:45 AM MD ALESSIO Calderon RF RF   10/28/2019  9:45 AM [facility]   11/4/2019  8:30 AM Dennys Oneal PT, D[facility]   11/11/2019  8:30 AM Dennys Oneal PT, DPT [facility]   9/28/2020  9:00 AM Carletta Bence, [place], MD [facility]

## 2019-10-15 NOTE — THERAPY EVALUATION
Dee Dee Mcbride  : 1960  Primary: Bib Driver Lila  Secondary:  [address] at [facility]  [address] , [address], [place] [address]  [phone]   [phone]        OUTPATIENT PHYSICAL THERAPY:Initial Assessment 10/14/2019    ICD-10: Treatment Diagnosis: Left achilles tendonitis ( M76.62)  Precautions/Allergies:   Patient has no known allergies. TREATMENT PLAN:  Effective Dates: 10/14/2019 TO 2019 (30 days). Frequency/Duration: 2 times a week for 30 Day(s)     MEDICAL/REFERRING DIAGNOSIS:  Achilles tendinitis of left lower extremity [M76.62]   DATE OF ONSET: 8 months ago  REFERRING PHYSICIAN: Christian Griffin MD MD Orders: Garon Phlegm and Treat  Return MD Appointment: 10-23-19     INITIAL ASSESSMENT:  Mr. Suzy Perez presents with complaints of insidious onset of pain in his left heel. Pt reports the pain is constant, but actually mildly relieves when walking his dog. Signs and symptoms are consistent with Achilles tendonitis of the left leg. PROBLEM LIST (Impacting functional limitations):  1. Decreased ADL/Functional Activities  2. Increased Pain  3. Decreased Activity Tolerance  4. Decreased Flexibility/Joint Mobility INTERVENTIONS PLANNED:  1. Cryotherapy  2. Electrical Stimulation  3. Home Exercise Program (HEP)  4. Manual Therapy  5. Therapeutic Exercise/Strengthening     GOALS: (Goals have been discussed and agreed upon with patient.)  Short-Term Functional Goals: Time Frame: 2 weeks  1. Independent in initial HEP  2. Increase left ankle dorsiflexion to 20 degrees  Discharge Goals: Time Frame: 4 weeks  1. Independent in advanced HEP  2. Minimal pain posterior left ankle  3. Return to full ADL's  CLINICAL DECISION MAKING:   Outcome Measure:    Tool Used: Lower Extremity Functional Scale (LEFS)  Score:  Initial: 74/80 (Date: 10-14-19) Most Recent: X/80 (Date: -- )   Interpretation of Score: 20 questions each scored on a 5 point scale with 0 representing \"extreme difficulty or unable to perform\" and 4 representing \"no difficulty\". The lower the score, the greater the functional disability. 80/80 represents no disability. Minimal detectable change is 9 points. Medical Necessity:   · Patient demonstrates good rehab potential due to higher previous functional level. Reason for Services/Other Comments:  · Patient continues to require modification of therapeutic interventions to increase complexity of exercises. PT Patient Time In/Time Out  Time In: 1030  Time Out: 1115  Rehabilitation Potential For Stated Goals: Good  Regarding Tony Doan's therapy, I certify that the treatment plan above will be carried out by a therapist or under their direction. Thank you for this referral,  Candido Gill, PT                 The information in this section was collected on 10-14-19 (except where otherwise noted). HISTORY:   History of Present Injury/Illness (Reason for Referral):  Pt reports insidious onset of left heel pain. Past Medical History/Comorbidities:   Mr. Filipe Hussein  has a past medical history of Chronic pharyngitis, Dry mouth, Erectile dysfunction, Essential hypertension, benign, Hypercholesteremia, Laryngopharyngeal reflux, Other and unspecified hyperlipidemia, Reflux gastritis, and S/P colonoscopy. Mr. Filipe uHssein  has a past surgical history that includes hx shoulder arthroscopy (Right); hx knee arthroscopy (Right); and hx colonoscopy (2010). Social History/Living Environment:     independent  Prior Level of Function/Work/Activity:  independent  Dominant Side:         RIGHT     Ambulatory/Rehab Services H2 Model Falls Risk Assessment    Risk Factors:       (1)  Gender [Male] Ability to Rise from Chair:       (0)  Ability to rise in a single movement    Falls Prevention Plan:       No modifications necessary   Total: (5 or greater = High Risk): 1    ©2010 [employer] [place]. All Rights Reserved. [facility] Patent #[id number].  Federal Law prohibits the replication, distribution or use without written permission from [facility]     Current Medications:       Current Outpatient Medications:     lisinopril (PRINIVIL, ZESTRIL) 10 mg tablet, TAKE ONE TABLET BY MOUTH EVERY DAY, Disp: 90 Tab, Rfl: 3    NEXIUM 40 mg capsule, Take 1 Cap by mouth daily. , Disp: 90 Cap, Rfl: 3    simvastatin (ZOCOR) 40 mg tablet, TAKE 1 TABLET BY MOUTH NIGHTLY, Disp: 90 Tab, Rfl: 3    sildenafil citrate (VIAGRA) 100 mg tablet, Take 1 Tab by mouth as needed (ED)., Disp: 10 Tab, Rfl: 5    meloxicam (MOBIC) 15 mg tablet, Take 1 Tab by mouth daily. , Disp: 30 Tab, Rfl: 0   Date Last Reviewed:  10/14/2019   Number of Personal Factors/Comorbidities that affect the Plan of Care: 0: LOW COMPLEXITY   EXAMINATION:   Observation/Orthostatic Postural Assessment:          Minimal antalgic gait on presentation  Palpation:          Mild TTP posterior left ankle along distal achilles tendon. ROM:          FROM left ankle except dorsiflexion limited to 10 degrees. Strength:          5/5 gross left LE   Body Structures Involved:  1. Muscles  2. Ligaments Body Functions Affected:  1. Movement Related Activities and Participation Affected:  1. General Tasks and Demands   Number of elements (examined above) that affect the Plan of Care: 1-2: LOW COMPLEXITY   CLINICAL PRESENTATION:   Presentation: Stable and uncomplicated: LOW COMPLEXITY      Use of outcome tool(s) and clinical judgement create a POC that gives a: Clear prediction of patient's progress: LOW COMPLEXITY                    Treatment/Session Assessment:    · Response to Treatment:  Pt reports understanding of and agreement with treatment plan. · Compliance with Program/Exercises: Will assess as treatment progresses. · Recommendations/Intent for next treatment session: \"Next visit will focus on advancements to more challenging activities\".     Future Appointments   Date Time Provider Felicia Montero   10/21/2019  9:45 AM Nadya Edward P, PT [facility]   10/23/2019 10:45 AM Ever Ashton MD [facility]   10/28/2019  9:45 AM Latrell Segura PT [facility]   11/4/2019  8:30 AM Martha Riding, PT, DPT [facility]   11/11/2019  8:30 AM Martha Riding, PT, DPT [facility]   9/28/2020  9:00 AM Tenisha Rodgers MD [facility]     Please explain any variance from Plan of Care.   Total Treatment Duration: 25 min tierra Mena, PT

## 2019-10-21 ENCOUNTER — HOSPITAL ENCOUNTER (OUTPATIENT)
Dept: PHYSICAL THERAPY | Age: 59
Discharge: HOME | End: 2019-10-21
Attending: FAMILY MEDICINE
Payer: COMMERCIAL

## 2019-10-21 PROCEDURE — 97110 THERAPEUTIC EXERCISES: CPT

## 2019-10-21 PROCEDURE — 97016 VASOPNEUMATIC DEVICE THERAPY: CPT

## 2019-10-21 NOTE — PROGRESS NOTES
Elner Blizzard  : 1960  Primary: Noé Gerard Benitawilliam Rpn  Secondary:  [address] at [facility]  [address] , [address], [place] [address]  [phone]   [phone]                                  OUTPATIENT PHYSICAL THERAPY: Daily Treatment Note 10/21/2019  ICD-10: Treatment Diagnosis: Left achilles tendonitis ( M76.62)  Precautions/Allergies:   Patient has no known allergies. TREATMENT PLAN:  Effective Dates: 10/14/2019 TO 2019 (30 days). Frequency/Duration: 2 times a week for 30 Day(s)     MEDICAL/REFERRING DIAGNOSIS:  Achilles tendinitis of left lower extremity [M76.62]   DATE OF ONSET: 8 months ago  REFERRING PHYSICIAN: Heron Hoang MD MD Orders: Eval and Treat  Return MD Appointment: 10-23-19     Pre-treatment Symptoms/Complaints:  Pt reports significant soreness after initial session, but improved over the week-end. .     Pain: Initial: Pain Intensity 1: 2  Pain Orientation 1: Left(heel)  Post Session:  2/10   Medications Last Reviewed:  10/21/2019  Updated Objective Findings:     TREATMENT:   THERAPEUTIC EXERCISE: (30 minutes):  Exercises per grid below to improve mobility, strength and coordination. Required minimal verbal cues to promote proper body mechanics. Progressed resistance, range and repetitions as indicated.      Date:  10-14-19 Date:  10-21-19 Date:     Activity/Exercise Parameters Parameters Parameters   gastroc stretch  Foot on wall 10 x 5\"     Recumbent stepper  Level 2 x 10'    Incline calf stretch  10 x 5\" BLE    Ball on wall squats  Staggered stance  2 x 10    Ankle pumps  X 10    Ankle circles  2 x 10    Ankle 4 way  RTB x 10 each      MODALITIES: (15 minutes):      vasopneumatic compression with Game Ready moderate compression and temp 36 degrees to left foot and ankle for edema reduction and pain control  Treatment/Session Summary:    · Response to Treatment: Caryl Xiong Frakni responded well to incline calf stretch. · Communication/Consultation:  None today  · Equipment provided today:  None today  · Recommendations/Intent for next treatment session: Next visit will focus on left heel pain.     Total Treatment Billable Duration:  30 min therex  15 min vaso  PT Patient Time In/Time Out  Time In: 1000  Time Out: 1045  Caitlyn Mahoney, PT    Future Appointments   Date Time Provider Felicia Montero   10/23/2019 10:45 AM Chemo Cabello MD [facility]   10/28/2019  9:45 AM [facility]   11/4/2019  8:30 AM Parveen Boo PT, DPT [facility]   11/11/2019  8:30 AM Parveen Boo, PT, DPT [facility]   9/28/2020  9:00 AM Claudia Renteria, Alfred Duran MD [facility]

## 2019-10-24 ENCOUNTER — APPOINTMENT (RX ONLY)
Dept: URBAN - METROPOLITAN AREA CLINIC 349 | Facility: CLINIC | Age: 59
Setting detail: DERMATOLOGY
End: 2019-10-24

## 2019-10-24 DIAGNOSIS — L57.8 OTHER SKIN CHANGES DUE TO CHRONIC EXPOSURE TO NONIONIZING RADIATION: ICD-10-CM

## 2019-10-24 DIAGNOSIS — L91.8 OTHER HYPERTROPHIC DISORDERS OF THE SKIN: ICD-10-CM

## 2019-10-24 DIAGNOSIS — D22 MELANOCYTIC NEVI: ICD-10-CM

## 2019-10-24 DIAGNOSIS — D18.0 HEMANGIOMA: ICD-10-CM

## 2019-10-24 PROBLEM — D22.62 MELANOCYTIC NEVI OF LEFT UPPER LIMB, INCLUDING SHOULDER: Status: ACTIVE | Noted: 2019-10-24

## 2019-10-24 PROBLEM — I10 ESSENTIAL (PRIMARY) HYPERTENSION: Status: ACTIVE | Noted: 2019-10-24

## 2019-10-24 PROBLEM — D22.5 MELANOCYTIC NEVI OF TRUNK: Status: ACTIVE | Noted: 2019-10-24

## 2019-10-24 PROBLEM — D18.01 HEMANGIOMA OF SKIN AND SUBCUTANEOUS TISSUE: Status: ACTIVE | Noted: 2019-10-24

## 2019-10-24 PROBLEM — D22.61 MELANOCYTIC NEVI OF RIGHT UPPER LIMB, INCLUDING SHOULDER: Status: ACTIVE | Noted: 2019-10-24

## 2019-10-24 PROCEDURE — 99213 OFFICE O/P EST LOW 20 MIN: CPT

## 2019-10-24 PROCEDURE — ? COUNSELING

## 2019-10-24 ASSESSMENT — LOCATION DETAILED DESCRIPTION DERM
LOCATION DETAILED: LEFT INFERIOR UPPER BACK
LOCATION DETAILED: LEFT FOREHEAD
LOCATION DETAILED: LEFT LATERAL INFERIOR CHEST
LOCATION DETAILED: XIPHOID
LOCATION DETAILED: STERNAL NOTCH
LOCATION DETAILED: LEFT SUPERIOR UPPER BACK
LOCATION DETAILED: LEFT SUPERIOR MEDIAL LOWER BACK
LOCATION DETAILED: LEFT LATERAL ABDOMEN
LOCATION DETAILED: RIGHT LATERAL INFERIOR CHEST
LOCATION DETAILED: LEFT ANTERIOR SHOULDER
LOCATION DETAILED: RIGHT CLAVICULAR SKIN
LOCATION DETAILED: PERIUMBILICAL SKIN
LOCATION DETAILED: LEFT MEDIAL UPPER BACK
LOCATION DETAILED: RIGHT ANTERIOR PROXIMAL UPPER ARM
LOCATION DETAILED: RIGHT POSTERIOR SHOULDER
LOCATION DETAILED: LEFT SUPERIOR ANTERIOR NECK
LOCATION DETAILED: EPIGASTRIC SKIN

## 2019-10-24 ASSESSMENT — LOCATION SIMPLE DESCRIPTION DERM
LOCATION SIMPLE: LEFT UPPER BACK
LOCATION SIMPLE: RIGHT SHOULDER
LOCATION SIMPLE: LEFT ANTERIOR NECK
LOCATION SIMPLE: RIGHT CLAVICULAR SKIN
LOCATION SIMPLE: RIGHT UPPER ARM
LOCATION SIMPLE: LEFT FOREHEAD
LOCATION SIMPLE: ABDOMEN
LOCATION SIMPLE: LEFT SHOULDER
LOCATION SIMPLE: CHEST
LOCATION SIMPLE: LEFT LOWER BACK

## 2019-10-24 ASSESSMENT — LOCATION ZONE DERM
LOCATION ZONE: FACE
LOCATION ZONE: NECK
LOCATION ZONE: TRUNK
LOCATION ZONE: ARM

## 2019-10-28 ENCOUNTER — HOSPITAL ENCOUNTER (OUTPATIENT)
Dept: PHYSICAL THERAPY | Age: 59
Discharge: HOME | End: 2019-10-28
Attending: FAMILY MEDICINE
Payer: COMMERCIAL

## 2019-10-28 PROCEDURE — 97110 THERAPEUTIC EXERCISES: CPT

## 2019-10-28 PROCEDURE — 97016 VASOPNEUMATIC DEVICE THERAPY: CPT

## 2019-10-28 NOTE — PROGRESS NOTES
Pacheco Bhagat  : 1960  Primary: Shirley Pedraza [place]  Secondary:  [address] at [facility]  [address], [address], [place]  [phone]   [phone]                                  OUTPATIENT PHYSICAL THERAPY: Daily Treatment Note 10/28/2019  ICD-10: Treatment Diagnosis: Left achilles tendonitis ( M76.62)  Precautions/Allergies:   Patient has no known allergies. TREATMENT PLAN:  Effective Dates: 10/14/2019 TO 2019 (30 days). Frequency/Duration: 2 times a week for 30 Day(s)     MEDICAL/REFERRING DIAGNOSIS:  Achilles tendinitis of left lower extremity [M76.62]   DATE OF ONSET: 8 months ago  REFERRING PHYSICIAN: King Villegas MD MD Orders: Eval and Treat  Return MD Appointment: 10-23-19     Pre-treatment Symptoms/Complaints:  Pt reports increase in pain, but states he has stopped the meloxicam.     Pain: Initial: Pain Intensity 1: 3  Pain Orientation 1: Left  Post Session:  2/10   Medications Last Reviewed:  10/28/2019  Updated Objective Findings:     TREATMENT:   THERAPEUTIC EXERCISE: (40 minutes):  Exercises per grid below to improve mobility, strength and coordination. Required minimal verbal cues to promote proper body mechanics. Progressed resistance, range and repetitions as indicated.      Date:  10-14-19 Date:  10-21-19 Date:  10-28-19   Activity/Exercise Parameters Parameters Parameters   gastroc stretch  Foot on wall 10 x 5\"     Recumbent stepper  Level 2 x 10' Level 2 x 10'   Incline calf stretch  10 x 5\" BLE 10 x 5\" BLE   Lunge step   6\" block  10 x 5\" BLE   Ant step ups   6\" block  10 x 5\" BLE   Lat step ups   6\" block  10 x 5\" BLE   Ant heel taps   6\" block  10 x 5\" BLE   Ball on wall squats  Staggered stance  2 x 10 Staggered stance  2 x 10   Ankle pumps  X 10    Ankle circles  2 x 10    Ankle 4 way  RTB x 10 each RTB x 10 each     MODALITIES: (15 minutes): vasopneumatic compression with Game Ready moderate compression and temp 36 degrees to left foot and ankle for edema reduction and pain control  Treatment/Session Summary:    · Response to Treatment: Dajuan Huynh still reporting pain mainly with pressure to posterior heel. · Communication/Consultation:  None today  · Equipment provided today:  RTB  · Recommendations/Intent for next treatment session: Next visit will focus on left heel pain. Discuss dry needling possibilities.     Total Treatment Billable Duration:  40 min therex  15 min vaso  PT Patient Time In/Time Out  Time In: 0945  Time Out: [address], PT    Future Appointments   Date Time Provider Felicia Montero   11/4/2019  8:30 AM [facility]   11/11/2019  8:30 AM Brigette Boston PT, DPT [facility]   9/28/2020  9:00 AM Dustin Ritter MD [facility]

## 2019-11-05 NOTE — THERAPY DISCHARGE
Misael Mcdowell  : 1960  Primary: Lita Mahan Rpn  Secondary:  [address] at [facility]  [address] , [address], [place] [address]  [phone]   [phone]        OUTPATIENT PHYSICAL THERAPY:Discharge 2019    ICD-10: Treatment Diagnosis: Left achilles tendonitis ( M76.62)  Precautions/Allergies:   Patient has no known allergies. TREATMENT PLAN:  Effective Dates: 10/14/2019 TO 2019 (30 days). Frequency/Duration: 2 times a week for 30 Day(s)     MEDICAL/REFERRING DIAGNOSIS:  Achilles tendinitis of left lower extremity [M76.62]   DATE OF ONSET: 8 months ago  REFERRING PHYSICIAN: Kosta Bruno MD MD Orders: Ifeanyi Benitez and Treat  Return MD Appointment: 10-23-19   ATTENDANCE: As of 19, Misael Mcdowell has attended 3 out of 4 scheduled visits, with 1 cancellation(s) and 0 no shows. INITIAL ASSESSMENT:  Mr. Odessa Hein presents with complaints of insidious onset of pain in his left heel. Pt reports the pain is constant, but actually mildly relieves when walking his dog. Signs and symptoms are consistent with Achilles tendonitis of the left leg. Progress: Mr. Odessa Hein cancelled his appointment 19 stating he was returning to physician due to lack of improvement. It is again noted that he has only had 3 treatments to date. We will discharge at this time at patients request.   PROBLEM LIST (Impacting functional limitations):  1. Decreased ADL/Functional Activities  2. Increased Pain  3. Decreased Activity Tolerance  4. Decreased Flexibility/Joint Mobility INTERVENTIONS PLANNED:  1. Cryotherapy  2. Electrical Stimulation  3. Home Exercise Program (HEP)  4. Manual Therapy  5. Therapeutic Exercise/Strengthening     GOALS: (Goals have been discussed and agreed upon with patient.)  Short-Term Functional Goals: Time Frame: 2 weeks  1. Independent in initial HEP  Goal Not Met 19  2.  Increase left ankle dorsiflexion to 20 degrees Goal Not Met 19  Discharge Goals: Time Frame: 4 weeks  1. Independent in advanced HEP  Goal Not Met 11-4-19  2. Minimal pain posterior left ankle  Goal Not Met 11-4-19  3. Return to full ADL's  Goal Not Met 11-4-19  CLINICAL DECISION MAKING:   Outcome Measure: Tool Used: Lower Extremity Functional Scale (LEFS)  Score:  Initial: 74/80 (Date: 10-14-19) Most Recent: X/80 (Date: -- )   Interpretation of Score: 20 questions each scored on a 5 point scale with 0 representing \"extreme difficulty or unable to perform\" and 4 representing \"no difficulty\". The lower the score, the greater the functional disability. 80/80 represents no disability. Minimal detectable change is 9 points. Rehabilitation Potential For Stated Goals: Good  Regarding Laban Shingles therapy, I certify that the treatment plan above will be carried out by a therapist or under their direction.   Thank you for this referral,  Valdez Steele, PT

## 2019-11-11 ENCOUNTER — APPOINTMENT (OUTPATIENT)
Dept: PHYSICAL THERAPY | Age: 59
End: 2019-11-11
Attending: FAMILY MEDICINE

## 2021-03-22 ENCOUNTER — HOSPITAL ENCOUNTER (OUTPATIENT)
Dept: LAB | Age: 61
Discharge: HOME | End: 2021-03-22

## 2021-03-22 PROCEDURE — 88305 TISSUE EXAM BY PATHOLOGIST: CPT

## 2021-10-21 ENCOUNTER — APPOINTMENT (RX ONLY)
Dept: URBAN - METROPOLITAN AREA CLINIC 349 | Facility: CLINIC | Age: 61
Setting detail: DERMATOLOGY
End: 2021-10-21

## 2021-10-21 DIAGNOSIS — D22 MELANOCYTIC NEVI: ICD-10-CM

## 2021-10-21 DIAGNOSIS — L82.1 OTHER SEBORRHEIC KERATOSIS: ICD-10-CM

## 2021-10-21 PROBLEM — D22.5 MELANOCYTIC NEVI OF TRUNK: Status: ACTIVE | Noted: 2021-10-21

## 2021-10-21 PROCEDURE — ? COUNSELING

## 2021-10-21 PROCEDURE — 99213 OFFICE O/P EST LOW 20 MIN: CPT

## 2021-10-21 ASSESSMENT — LOCATION DETAILED DESCRIPTION DERM
LOCATION DETAILED: RIGHT INFERIOR MEDIAL UPPER BACK
LOCATION DETAILED: RIGHT SUPERIOR MEDIAL UPPER BACK
LOCATION DETAILED: RIGHT SUPERIOR UPPER BACK

## 2021-10-21 ASSESSMENT — LOCATION ZONE DERM: LOCATION ZONE: TRUNK

## 2021-10-21 ASSESSMENT — LOCATION SIMPLE DESCRIPTION DERM: LOCATION SIMPLE: RIGHT UPPER BACK

## 2021-10-21 NOTE — HPI: FULL BODY SKIN EXAMINATION
What Is The Reason For Today's Visit?: Annual Full Body Skin Examination with No Concerns
What Is The Reason For Today's Visit? (Being Monitored For X): the development of a new lesion

## 2022-12-01 ENCOUNTER — APPOINTMENT (RX ONLY)
Dept: URBAN - METROPOLITAN AREA CLINIC 329 | Facility: CLINIC | Age: 62
Setting detail: DERMATOLOGY
End: 2022-12-01

## 2022-12-01 DIAGNOSIS — Z12.83 ENCOUNTER FOR SCREENING FOR MALIGNANT NEOPLASM OF SKIN: ICD-10-CM

## 2022-12-01 DIAGNOSIS — D22 MELANOCYTIC NEVI: ICD-10-CM

## 2022-12-01 DIAGNOSIS — L57.8 OTHER SKIN CHANGES DUE TO CHRONIC EXPOSURE TO NONIONIZING RADIATION: ICD-10-CM

## 2022-12-01 PROBLEM — D22.5 MELANOCYTIC NEVI OF TRUNK: Status: ACTIVE | Noted: 2022-12-01

## 2022-12-01 PROCEDURE — ? FULL BODY SKIN EXAM

## 2022-12-01 PROCEDURE — ? COUNSELING

## 2022-12-01 PROCEDURE — 99213 OFFICE O/P EST LOW 20 MIN: CPT

## 2022-12-01 ASSESSMENT — LOCATION DETAILED DESCRIPTION DERM
LOCATION DETAILED: RIGHT SUPERIOR MEDIAL UPPER BACK
LOCATION DETAILED: LEFT SUPERIOR MEDIAL UPPER BACK
LOCATION DETAILED: RIGHT INFERIOR UPPER BACK
LOCATION DETAILED: LEFT MEDIAL INFERIOR CHEST
LOCATION DETAILED: LEFT SUPERIOR UPPER BACK
LOCATION DETAILED: LEFT SUPERIOR MEDIAL FOREHEAD
LOCATION DETAILED: PERIUMBILICAL SKIN
LOCATION DETAILED: EPIGASTRIC SKIN
LOCATION DETAILED: EPIGASTRIC SKIN
LOCATION DETAILED: LEFT MEDIAL SUPERIOR CHEST
LOCATION DETAILED: LEFT LATERAL UPPER BACK
LOCATION DETAILED: RIGHT SUPERIOR MEDIAL UPPER BACK
LOCATION DETAILED: STERNUM

## 2022-12-01 ASSESSMENT — LOCATION SIMPLE DESCRIPTION DERM
LOCATION SIMPLE: ABDOMEN
LOCATION SIMPLE: RIGHT UPPER BACK
LOCATION SIMPLE: RIGHT UPPER BACK
LOCATION SIMPLE: LEFT FOREHEAD
LOCATION SIMPLE: CHEST
LOCATION SIMPLE: CHEST
LOCATION SIMPLE: LEFT UPPER BACK
LOCATION SIMPLE: ABDOMEN
LOCATION SIMPLE: LEFT UPPER BACK

## 2022-12-01 ASSESSMENT — LOCATION ZONE DERM
LOCATION ZONE: TRUNK
LOCATION ZONE: FACE
LOCATION ZONE: TRUNK

## 2024-01-19 ENCOUNTER — APPOINTMENT (RX ONLY)
Dept: URBAN - METROPOLITAN AREA CLINIC 329 | Facility: CLINIC | Age: 64
Setting detail: DERMATOLOGY
End: 2024-01-19

## 2024-01-19 DIAGNOSIS — L82.1 OTHER SEBORRHEIC KERATOSIS: ICD-10-CM

## 2024-01-19 DIAGNOSIS — D18.0 HEMANGIOMA: ICD-10-CM

## 2024-01-19 DIAGNOSIS — L57.0 ACTINIC KERATOSIS: ICD-10-CM | Status: INADEQUATELY CONTROLLED

## 2024-01-19 DIAGNOSIS — L81.4 OTHER MELANIN HYPERPIGMENTATION: ICD-10-CM

## 2024-01-19 DIAGNOSIS — D22 MELANOCYTIC NEVI: ICD-10-CM

## 2024-01-19 PROBLEM — D22.72 MELANOCYTIC NEVI OF LEFT LOWER LIMB, INCLUDING HIP: Status: ACTIVE | Noted: 2024-01-19

## 2024-01-19 PROBLEM — D22.62 MELANOCYTIC NEVI OF LEFT UPPER LIMB, INCLUDING SHOULDER: Status: ACTIVE | Noted: 2024-01-19

## 2024-01-19 PROBLEM — D22.5 MELANOCYTIC NEVI OF TRUNK: Status: ACTIVE | Noted: 2024-01-19

## 2024-01-19 PROBLEM — D22.61 MELANOCYTIC NEVI OF RIGHT UPPER LIMB, INCLUDING SHOULDER: Status: ACTIVE | Noted: 2024-01-19

## 2024-01-19 PROBLEM — D18.01 HEMANGIOMA OF SKIN AND SUBCUTANEOUS TISSUE: Status: ACTIVE | Noted: 2024-01-19

## 2024-01-19 PROCEDURE — ? DERMATOSCOPIC EVALUATION

## 2024-01-19 PROCEDURE — ? LIQUID NITROGEN

## 2024-01-19 PROCEDURE — 17000 DESTRUCT PREMALG LESION: CPT

## 2024-01-19 PROCEDURE — ? TREATMENT REGIMEN

## 2024-01-19 PROCEDURE — ? FULL BODY SKIN EXAM

## 2024-01-19 PROCEDURE — 17003 DESTRUCT PREMALG LES 2-14: CPT

## 2024-01-19 PROCEDURE — 99213 OFFICE O/P EST LOW 20 MIN: CPT | Mod: 25

## 2024-01-19 PROCEDURE — ? COUNSELING

## 2024-01-19 ASSESSMENT — LOCATION ZONE DERM
LOCATION ZONE: TRUNK
LOCATION ZONE: LEG
LOCATION ZONE: EAR
LOCATION ZONE: ARM
LOCATION ZONE: FACE

## 2024-01-19 ASSESSMENT — LOCATION SIMPLE DESCRIPTION DERM
LOCATION SIMPLE: LEFT FOREARM
LOCATION SIMPLE: RIGHT THIGH
LOCATION SIMPLE: SUPERIOR FOREHEAD
LOCATION SIMPLE: LEFT THIGH
LOCATION SIMPLE: ABDOMEN
LOCATION SIMPLE: RIGHT EAR
LOCATION SIMPLE: LEFT SHOULDER
LOCATION SIMPLE: CHEST
LOCATION SIMPLE: RIGHT SHOULDER
LOCATION SIMPLE: LEFT EAR
LOCATION SIMPLE: LEFT UPPER BACK
LOCATION SIMPLE: LEFT CHEEK

## 2024-01-19 ASSESSMENT — LOCATION DETAILED DESCRIPTION DERM
LOCATION DETAILED: LEFT PROXIMAL DORSAL FOREARM
LOCATION DETAILED: RIGHT ANTERIOR SHOULDER
LOCATION DETAILED: EPIGASTRIC SKIN
LOCATION DETAILED: LEFT POSTERIOR SHOULDER
LOCATION DETAILED: RIGHT ANTERIOR DISTAL THIGH
LOCATION DETAILED: PERIUMBILICAL SKIN
LOCATION DETAILED: LEFT INFERIOR UPPER BACK
LOCATION DETAILED: LEFT SUPERIOR PREAURICULAR CHEEK
LOCATION DETAILED: LEFT INFERIOR MEDIAL UPPER BACK
LOCATION DETAILED: SUPERIOR MID FOREHEAD
LOCATION DETAILED: RIGHT LATERAL SUPERIOR CHEST
LOCATION DETAILED: RIGHT ANTERIOR EARLOBE
LOCATION DETAILED: LEFT TRAGUS
LOCATION DETAILED: LEFT ANTERIOR PROXIMAL THIGH

## 2024-01-19 NOTE — PROCEDURE: LIQUID NITROGEN
Post-Care Instructions: I reviewed with the patient in detail post-care instructions. Patient is to wear sunprotection, and avoid picking at any of the treated lesions. Pt may apply Vaseline to crusted or scabbing areas.
Render Post-Care Instructions In Note?: no
Detail Level: Detailed
Duration Of Freeze Thaw-Cycle (Seconds): 0
Consent: The patient's consent was obtained including but not limited to risks of crusting, scabbing, blistering, scarring, darker or lighter pigmentary change, recurrence, incomplete removal and infection.
Show Applicator Variable?: Yes

## 2025-01-23 ENCOUNTER — APPOINTMENT (OUTPATIENT)
Dept: URBAN - METROPOLITAN AREA CLINIC 329 | Facility: CLINIC | Age: 65
Setting detail: DERMATOLOGY
End: 2025-01-23

## 2025-01-23 DIAGNOSIS — D18.0 HEMANGIOMA: ICD-10-CM

## 2025-01-23 DIAGNOSIS — L57.8 OTHER SKIN CHANGES DUE TO CHRONIC EXPOSURE TO NONIONIZING RADIATION: ICD-10-CM

## 2025-01-23 DIAGNOSIS — L81.4 OTHER MELANIN HYPERPIGMENTATION: ICD-10-CM

## 2025-01-23 DIAGNOSIS — D22 MELANOCYTIC NEVI: ICD-10-CM

## 2025-01-23 DIAGNOSIS — L72.0 EPIDERMAL CYST: ICD-10-CM

## 2025-01-23 DIAGNOSIS — L57.0 ACTINIC KERATOSIS: ICD-10-CM

## 2025-01-23 DIAGNOSIS — L82.1 OTHER SEBORRHEIC KERATOSIS: ICD-10-CM

## 2025-01-23 PROBLEM — D22.61 MELANOCYTIC NEVI OF RIGHT UPPER LIMB, INCLUDING SHOULDER: Status: ACTIVE | Noted: 2025-01-23

## 2025-01-23 PROBLEM — D22.72 MELANOCYTIC NEVI OF LEFT LOWER LIMB, INCLUDING HIP: Status: ACTIVE | Noted: 2025-01-23

## 2025-01-23 PROBLEM — D22.71 MELANOCYTIC NEVI OF RIGHT LOWER LIMB, INCLUDING HIP: Status: ACTIVE | Noted: 2025-01-23

## 2025-01-23 PROBLEM — D22.5 MELANOCYTIC NEVI OF TRUNK: Status: ACTIVE | Noted: 2025-01-23

## 2025-01-23 PROBLEM — D18.01 HEMANGIOMA OF SKIN AND SUBCUTANEOUS TISSUE: Status: ACTIVE | Noted: 2025-01-23

## 2025-01-23 PROCEDURE — 17110 DESTRUCTION B9 LES UP TO 14: CPT

## 2025-01-23 PROCEDURE — ? ELECTRODESICCATION

## 2025-01-23 PROCEDURE — 99213 OFFICE O/P EST LOW 20 MIN: CPT | Mod: 25

## 2025-01-23 PROCEDURE — 17000 DESTRUCT PREMALG LESION: CPT | Mod: 59

## 2025-01-23 PROCEDURE — ? LIQUID NITROGEN

## 2025-01-23 PROCEDURE — 17003 DESTRUCT PREMALG LES 2-14: CPT | Mod: 59

## 2025-01-23 PROCEDURE — ? COUNSELING

## 2025-01-23 PROCEDURE — ? TREATMENT REGIMEN

## 2025-01-23 ASSESSMENT — LOCATION DETAILED DESCRIPTION DERM
LOCATION DETAILED: LEFT ANTERIOR DISTAL UPPER ARM
LOCATION DETAILED: LEFT SUPERIOR OCCIPITAL SCALP
LOCATION DETAILED: MID-FRONTAL SCALP
LOCATION DETAILED: LEFT DISTAL CALF
LOCATION DETAILED: RIGHT PROXIMAL DORSAL FOREARM
LOCATION DETAILED: UPPER STERNUM
LOCATION DETAILED: RIGHT VENTRAL DISTAL FOREARM
LOCATION DETAILED: LEFT SUPERIOR LATERAL MALAR CHEEK
LOCATION DETAILED: SUPERIOR THORACIC SPINE
LOCATION DETAILED: RIGHT ANTERIOR DISTAL UPPER ARM
LOCATION DETAILED: LEFT INFERIOR UPPER BACK
LOCATION DETAILED: RIGHT CLAVICULAR SKIN
LOCATION DETAILED: LEFT DISTAL POSTERIOR THIGH
LOCATION DETAILED: LEFT SUPERIOR MEDIAL MALAR CHEEK
LOCATION DETAILED: LEFT PROXIMAL POSTERIOR THIGH
LOCATION DETAILED: EPIGASTRIC SKIN
LOCATION DETAILED: RIGHT ANTERIOR DISTAL THIGH
LOCATION DETAILED: LEFT CENTRAL MALAR CHEEK
LOCATION DETAILED: RIGHT CENTRAL FRONTAL SCALP
LOCATION DETAILED: LEFT CENTRAL FRONTAL SCALP
LOCATION DETAILED: RIGHT SUPERIOR MEDIAL UPPER BACK

## 2025-01-23 ASSESSMENT — LOCATION SIMPLE DESCRIPTION DERM
LOCATION SIMPLE: LEFT OCCIPITAL SCALP
LOCATION SIMPLE: LEFT CHEEK
LOCATION SIMPLE: CHEST
LOCATION SIMPLE: RIGHT CLAVICULAR SKIN
LOCATION SIMPLE: LEFT UPPER ARM
LOCATION SIMPLE: RIGHT THIGH
LOCATION SIMPLE: RIGHT SCALP
LOCATION SIMPLE: LEFT UPPER BACK
LOCATION SIMPLE: ANTERIOR SCALP
LOCATION SIMPLE: RIGHT UPPER ARM
LOCATION SIMPLE: UPPER BACK
LOCATION SIMPLE: LEFT SCALP
LOCATION SIMPLE: ABDOMEN
LOCATION SIMPLE: RIGHT UPPER BACK
LOCATION SIMPLE: RIGHT FOREARM
LOCATION SIMPLE: LEFT CALF
LOCATION SIMPLE: LEFT POSTERIOR THIGH

## 2025-01-23 ASSESSMENT — LOCATION ZONE DERM
LOCATION ZONE: TRUNK
LOCATION ZONE: FACE
LOCATION ZONE: LEG
LOCATION ZONE: SCALP
LOCATION ZONE: ARM

## 2025-01-23 NOTE — HPI: EVALUATION OF SKIN LESION(S)
What Type Of Note Output Would You Prefer (Optional)?: Bullet Format
Hpi Title: Evaluation of Skin Lesions
Additional History: The patient reports concerns about a lesion on his left cheek.

## 2025-01-23 NOTE — PROCEDURE: LIQUID NITROGEN
Patient c/o Jonna Ormond abdominal pain, nausea and loose bowel, onset yesterday, headache onset today, has not taken any pain meds today
Show Applicator Variable?: Yes
Duration Of Freeze Thaw-Cycle (Seconds): 0
Post-Care Instructions: I reviewed with the patient in detail post-care instructions. Patient is to wear sunprotection, and avoid picking at any of the treated lesions. Pt may apply Vaseline to crusted or scabbing areas.
Detail Level: Detailed
Render Post-Care Instructions In Note?: no
Consent: The patient's consent was obtained including but not limited to risks of crusting, scabbing, blistering, scarring, darker or lighter pigmentary change, recurrence, incomplete removal and infection.

## 2025-01-23 NOTE — PROCEDURE: ELECTRODESICCATION
Medical Necessity Information: It is in your best interest to select a reason for this procedure from the list below. All of these items fulfill various CMS LCD requirements except the new and changing color options.
Include Z78.9 (Other Specified Conditions Influencing Health Status) As An Associated Diagnosis?: No
Medical Necessity Clause: This procedure was medically necessary because the lesions that were treated were:
Post-Care Instructions: I reviewed with the patient in detail post-care instructions. Patient is to wear sunprotection, and avoid picking at any of the treated lesions. Pt may apply Vaseline to crusted or scabbing areas
Detail Level: Simple
Consent: The patient's consent was obtained including but not limited to risks of crusting, scabbing, blistering, scarring, darker or lighter pigmentary change, recurrence, incomplete removal and infection.
Anesthesia Type: 1% lidocaine with epinephrine